# Patient Record
Sex: FEMALE | Race: OTHER | HISPANIC OR LATINO | Employment: STUDENT | URBAN - METROPOLITAN AREA
[De-identification: names, ages, dates, MRNs, and addresses within clinical notes are randomized per-mention and may not be internally consistent; named-entity substitution may affect disease eponyms.]

---
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
## 2025-06-02 ENCOUNTER — HOSPITAL ENCOUNTER (EMERGENCY)
Facility: HOSPITAL | Age: 25
Discharge: HOME | End: 2025-06-02
Attending: EMERGENCY MEDICINE
Payer: MEDICARE

## 2025-06-02 VITALS
RESPIRATION RATE: 17 BRPM | DIASTOLIC BLOOD PRESSURE: 68 MMHG | HEART RATE: 84 BPM | BODY MASS INDEX: 23.29 KG/M2 | TEMPERATURE: 98.3 F | WEIGHT: 119.27 LBS | OXYGEN SATURATION: 100 % | SYSTOLIC BLOOD PRESSURE: 108 MMHG

## 2025-06-02 DIAGNOSIS — O26.899 ABDOMINAL PAIN DURING PREGNANCY, ANTEPARTUM: Primary | ICD-10-CM

## 2025-06-02 DIAGNOSIS — R10.9 ABDOMINAL PAIN DURING PREGNANCY, ANTEPARTUM: Primary | ICD-10-CM

## 2025-06-02 LAB
B-HCG SERPL-ACNC: 35.9 MIU/ML (ref 0–5)
BACTERIA UR QL AUTO: ABNORMAL /HPF
BILIRUB UR QL STRIP: NEGATIVE
CLARITY UR: CLEAR
COLOR UR: ABNORMAL
GLUCOSE UR STRIP-MCNC: NEGATIVE MG/DL
HGB UR QL STRIP.AUTO: 10
KETONES UR STRIP-MCNC: NEGATIVE MG/DL
LEUKOCYTE ESTERASE UR QL STRIP: NEGATIVE
MUCOUS THREADS UR QL AUTO: ABNORMAL
NITRITE UR QL STRIP: NEGATIVE
NON-SQ EPI CELLS URNS QL MICRO: ABNORMAL /HPF
PH UR STRIP.AUTO: 6 [PH]
PROT UR STRIP-MCNC: NEGATIVE MG/DL
RBC #/AREA URNS AUTO: ABNORMAL /HPF
SP GR UR STRIP.AUTO: 1.02 (ref 1–1.04)
UROBILINOGEN UA: NEGATIVE MG/DL
WBC #/AREA URNS AUTO: ABNORMAL /HPF

## 2025-06-02 PROCEDURE — 84702 CHORIONIC GONADOTROPIN TEST: CPT | Performed by: EMERGENCY MEDICINE

## 2025-06-02 PROCEDURE — 36415 COLL VENOUS BLD VENIPUNCTURE: CPT | Performed by: EMERGENCY MEDICINE

## 2025-06-02 PROCEDURE — 99284 EMERGENCY DEPT VISIT MOD MDM: CPT | Performed by: EMERGENCY MEDICINE

## 2025-06-02 PROCEDURE — 81003 URINALYSIS AUTO W/O SCOPE: CPT | Performed by: EMERGENCY MEDICINE

## 2025-06-02 PROCEDURE — 81001 URINALYSIS AUTO W/SCOPE: CPT | Performed by: EMERGENCY MEDICINE

## 2025-06-02 PROCEDURE — 99283 EMERGENCY DEPT VISIT LOW MDM: CPT

## 2025-06-02 RX ORDER — ACETAMINOPHEN 325 MG/1
650 TABLET ORAL ONCE
Status: COMPLETED | OUTPATIENT
Start: 2025-06-02 | End: 2025-06-02

## 2025-06-02 RX ADMIN — ACETAMINOPHEN 650 MG: 325 TABLET ORAL at 02:00

## 2025-06-02 NOTE — ED PROVIDER NOTES
Time reflects when diagnosis was documented in both MDM as applicable and the Disposition within this note       Time User Action Codes Description Comment    6/2/2025  2:51 AM Check, Erick Add [O26.899,  R10.9] Abdominal pain during pregnancy, antepartum           ED Disposition       ED Disposition   Discharge    Condition   Stable    Date/Time   Mon Jun 2, 2025  2:51 AM    Comment   Bella Brenner Henson discharge to home/self care.                   Assessment & Plan       Medical Decision Making  25-year-old female presents to the emergency department for evaluation of right lower quadrant abdominal pain.  Differential diagnosis includes but is not limited to pregnancy, ectopic pregnancy, cystitis.  Plan to check quantitative hCG, urinalysis and reassess.    Quantitative hCG 35.9 likely demonstrating early pregnancy, cannot rule out ectopic pregnancy.  Attempted to perform bedside ultrasound, unable to identify definitive IUP which is similar to prior study.  Urinalysis negative for infection.  Patient stable for discharge with outpatient follow-up.  Order for quantitative hCG to be performed in the next 48 hours.  Patient instructed to call her OB/GYN office in the morning to schedule close outpatient follow-up.  These instructions were given to the patient utilizing .  She expressed understanding was in agreement with the plan.    Problems Addressed:  Abdominal pain during pregnancy, antepartum: acute illness or injury    Amount and/or Complexity of Data Reviewed  External Data Reviewed: notes.  Labs: ordered. Decision-making details documented in ED Course.    Risk  OTC drugs.        ED Course as of 06/02/25 0500   Mon Jun 02, 2025   0233 HCG QUANTITATIVE(!): 35.9  Trending upward from 2 days ago       Medications   acetaminophen (TYLENOL) tablet 650 mg (650 mg Oral Given 6/2/25 0200)       ED Risk Strat Scores                    No data recorded                            History of  Present Illness       Chief Complaint   Patient presents with    Abdominal Pain Pregnant       Past Medical History[1]   Past Surgical History[2]   Family History[3]   Social History[4]   E-Cigarette/Vaping    E-Cigarette Use Current Every Day User       E-Cigarette/Vaping Substances    Nicotine Yes       I have reviewed and agree with the history as documented.     25-year-old female presents to the emergency department for evaluation of ongoing right lower quadrant abdominal pain.  Pain started couple days ago.  Is intermittent.  Nonradiating.  Is moderate in intensity.  No obvious worsening or alleviating factors.  No associated vaginal bleeding or discharge.  No headache, fever, chills, chest pain or shortness of breath.  She does report some nausea, but no vomiting.  She was seen at [facility] for the same pain, at that time she had a positive pregnancy test, they performed a pelvic ultrasound which did not identify a definitive IUP.  She also had an ultrasound which was negative for ovarian torsion and she was discharged home to follow-up closely with OB/GYN.  She states her last menstrual period was April 16.        Review of Systems   Constitutional:  Negative for chills and fever.   HENT:  Negative for ear pain and sore throat.    Eyes:  Negative for pain and visual disturbance.   Respiratory:  Negative for cough and shortness of breath.    Cardiovascular:  Negative for chest pain and palpitations.   Gastrointestinal:  Positive for abdominal pain and nausea. Negative for vomiting.   Genitourinary:  Negative for dysuria and hematuria.   Musculoskeletal:  Negative for arthralgias and back pain.   Skin:  Negative for color change and rash.   Neurological:  Negative for seizures and syncope.   All other systems reviewed and are negative.          Objective       ED Triage Vitals   Temperature Pulse Blood Pressure Respirations SpO2 Patient Position - Orthostatic VS   06/02/25 0141 06/02/25 0125  06/02/25 0125 06/02/25 0125 06/02/25 0125 06/02/25 0125   98.3 °F (36.8 °C) 84 (!) 157/120 17 100 % Sitting      Temp Source Heart Rate Source BP Location FiO2 (%) Pain Score    06/02/25 0141 06/02/25 0125 06/02/25 0125 -- 06/02/25 0125    Oral Monitor Right arm  7      Vitals      Date and Time Temp Pulse SpO2 Resp BP Pain Score FACES Pain Rating User   06/02/25 0257 -- -- -- -- 108/68 -- -- RC   06/02/25 0200 -- -- -- -- -- 7 -- CFW   06/02/25 0141 98.3 °F (36.8 °C) -- -- -- -- -- -- CFW   06/02/25 0125 -- 84 100 % 17 157/120 7 -- CFW            Physical Exam  Vitals and nursing note reviewed.   Constitutional:       General: She is not in acute distress.  HENT:      Head: Normocephalic and atraumatic.      Right Ear: External ear normal.      Left Ear: External ear normal.      Nose: Nose normal.      Mouth/Throat:      Mouth: Mucous membranes are moist.     Eyes:      Extraocular Movements: Extraocular movements intact.      Conjunctiva/sclera: Conjunctivae normal.      Pupils: Pupils are equal, round, and reactive to light.       Cardiovascular:      Rate and Rhythm: Normal rate and regular rhythm.      Pulses: Normal pulses.      Heart sounds: Normal heart sounds. No murmur heard.  Pulmonary:      Effort: Pulmonary effort is normal. No respiratory distress.      Breath sounds: No stridor.   Abdominal:      General: Abdomen is flat.      Tenderness: There is no abdominal tenderness. There is no guarding or rebound.     Musculoskeletal:         General: No deformity. Normal range of motion.      Cervical back: Normal range of motion and neck supple.     Skin:     General: Skin is warm and dry.      Capillary Refill: Capillary refill takes less than 2 seconds.     Neurological:      General: No focal deficit present.      Mental Status: She is alert and oriented to person, place, and time.     Psychiatric:         Mood and Affect: Mood normal.         Behavior: Behavior normal.         Results Reviewed        Procedure Component Value Units Date/Time    hCG, quantitative [[id number]]  (Abnormal) Collected: 06/02/25 0157    Lab Status: Final result Specimen: Blood from Arm, Left Updated: 06/02/25 0231     HCG, Quant 35.9 mIU/mL     Narrative:       Expected Ranges:    HCG results between 5.0 and 25.0 mIU/mL may be indicative of early pregnancy but should be interpreted in light of the total clinical presentation.    HCG can rise to detectable levels in derik and post menopausal women (0-11.6 mIU/mL).     Approximate               Approximate HCG  Gestation age          Concentration ( mIU/mL)  _____________          ______________________   Weeks                      HCG values  0.2-1                       5-50  1-2                           2-3                         100-5000  3-4                         500-60427  4-5                         1000-70858  5-6                         [id number]  6-8                         [id number]  8-12                        [id number]      Urine Microscopic [[id number]]  (Abnormal) Collected: 06/02/25 0159    Lab Status: Final result Specimen: Urine, Clean Catch Updated: 06/02/25 0215     RBC, UA 1-2 /hpf      WBC, UA 1-2 /hpf      Epithelial Cells Occasional /hpf      Bacteria, UA Occasional /hpf      MUCUS THREADS Occasional    UA w Reflex to Microscopic w Reflex to Culture [[id number]]  (Abnormal) Collected: 06/02/25 0159    Lab Status: Final result Specimen: Urine, Clean Catch Updated: 06/02/25 0214     Color, UA Straw     Clarity, UA Clear     Specific Gravity, UA 1.020     pH, UA 6.0     Leukocytes, UA Negative     Nitrite, UA Negative     Protein, UA Negative mg/dl      Glucose, UA Negative mg/dl      Ketones, UA Negative mg/dl      Bilirubin, UA Negative     Occult Blood, UA 10.0     UROBILINOGEN UA Negative mg/dL             No orders to display       Procedures    ED Medication and Procedure Management   Prior to Admission Medications   Prescriptions Last Dose  Informant Patient Reported? Taking?   Prenatal Multivit-Min-Fe-FA (PRE-JAIRO PO)   Yes No   Sig: Take by mouth   acetaminophen (TYLENOL) 325 mg tablet   No No   Sig: Take 2 tablets (650 mg total) by mouth every 6 (six) hours as needed for headaches   ferrous sulfate 325 (65 Fe) mg tablet  Self Yes No   Sig: Take 325 mg by mouth daily with breakfast   ibuprofen (MOTRIN) 200 mg tablet   No No   Sig: Take 3 tablets (600 mg total) by mouth every 6 (six) hours as needed for mild pain      Facility-Administered Medications: None     Discharge Medication List as of 2025  2:57 AM        CONTINUE these medications which have NOT CHANGED    Details   acetaminophen (TYLENOL) 325 mg tablet Take 2 tablets (650 mg total) by mouth every 6 (six) hours as needed for headaches, Starting 2020, Normal      ferrous sulfate 325 (65 Fe) mg tablet Take 325 mg by mouth daily with breakfast, Historical Med      ibuprofen (MOTRIN) 200 mg tablet Take 3 tablets (600 mg total) by mouth every 6 (six) hours as needed for mild pain, Starting 2020, No Print      Prenatal Multivit-Min-Fe-FA (PRE-JAIRO PO) Take by mouth, Historical Med           Outpatient Discharge Orders   hCG, quantitative   Standing Status: Future Standing Exp. Date: 26     ED SEPSIS DOCUMENTATION   Time reflects when diagnosis was documented in both MDM as applicable and the Disposition within this note       Time User Action Codes Description Comment    2025  2:51 AM Erick Bryan Add [O26.899,  R10.9] Abdominal pain during pregnancy, antepartum                    [1] No past medical history on file.  [2] No past surgical history on file.  [3]   Family History  Problem Relation Name Age of Onset    No Known Problems Mother      No Known Problems Father     [4]   Social History  Tobacco Use    Smoking status: Never    Smokeless tobacco: Never   Vaping Use    Vaping status: Every Day    Substances: Nicotine   Substance Use Topics    Alcohol use: No     Drug use: No        Erick Bryan MD  06/02/25 8226

## 2025-06-02 NOTE — DISCHARGE INSTRUCTIONS
Recommend Tylenol 500 mg every 6 hours as needed for pain  Take Zofran as needed for nausea and vomiting  Recommend you start taking prenatal vitamins  Have repeat blood work performed in 48 hours  Follow-up closely with OB/GYN, return for any worsening symptoms    Patient Education     Dolor de estómago en las primeras etapas del embarazo   Acerca de solitario krysten   Muchas mujeres sienten dolor de estómago o calambres en el primer trimestre del embarazo. El primer trimestre dura hasta el final de la semana 12. Cuando está embarazada, colon cuerpo cambia por dentro y por fuera a medida que colon bebé crece. Estos cambios pueden causar dolor o calambres que son normales valeria el embarazo.  ¿Cuáles son las causas?   El dolor de estómago es muy común valeria el embarazo y puede ocurrir por muchas razones. Puede tener dolor o calambres:  Es posible que tenga calambres leves cuando el bebé se implante en el revestimiento del útero.  A medida que el bebé crece, colon útero se estira y crece. Colon útero se mantiene en colon lugar mediante ligamentos que también se estiran valeria el embarazo. A medida que colon útero se agranda y lisa de estos ligamentos, puede causar dolor. Randall se llama dolor de ligamento keegan.  A medida que te mueves. Llevar a un bebé ejerce presión sobre carola articulaciones, músculos y ligamentos.  Cuando tienes un orgasmo.  Si tiene gases o está estreñido. Colon tracto digestivo se ralentiza mientras está embarazada.  Si se enferma con la gripe estomacal o un virus.  Si tiene latosha infección de transmisión sexual, helio gonorrea, clamidia, vaginosis bacteriana o infecciones por hongos.  Si tiene latosha infección en el tracto urinario.  Si el embarazo está creciendo fuera del útero, helio en latosha trompa de Falopio u ovario.  ¿Qué medicamentos pueden ser necesarios?   Es posible que el médico le recete medicamentos para lo siguiente:  Aliviar el dolor, helio el acetaminofén (Tylenol)  Aliviar la constipación  Aliviar los  gases  Tratar latosha infección  Consulte siempre al médico antes de charlotte cualquier medicamento mientras está embarazada.  ¿Estará restringida la actividad física?   Hable con colon médico sobre los ejercicios que pueden ser apropiados para usted mientras está embarazada. Son útiles los ejercicios de estiramiento, el yoga y los ejercicios para el tronco, fede consulte al médico antes de empezar con un nuevo programa de ejercicios.  ¿Será necesario algún otro cuidado?   Pregunte a colon médico qué debe hacer al llegar a casa. Asegúrese de hacer preguntas si no entiende lo que debe hacer.  Si el médico le indica que utilice calor, tome un baño tibio o póngase latosha almohadilla térmica en el estómago valeria no más de 20 minutos por vez. No duerma con la almohadilla térmica, ya que podría sufrir quemaduras.  Cambie de posición lentamente. Inclínese y flexiónese por las caderas para evitar tirar de los ligamentos redondos. Puede aliviar el dolor si se acuesta y reposa con latosha almohada entre las piernas.  Aumente la cantidad de agua que roopa. Debe beber de al menos 8 vasos de agua cada día. Es posible que tenga menos calambres si roopa suficiente agua.  Aumente la cantidad de fibra en colon dieta. La fibra adicional puede ayudarla a ir al baño y prevenir el estreñimiento. Hable con colon médico sobre cómo comer latosha dieta saludable valeria el embarazo.  ¿Qué problemas podrían surgir?   La fatiga también puede ser signo de un problema más grave. A veces, el dolor de estómago valeria el embarazo temprano puede ser un signo de latosha infección, un aborto espontáneo o un embarazo ectópico.  ¿Cuándo vamshi llamar al médico?   Llame al servicio de emergencias si:  Tiene latosha hemorragia tan grave que se siente muy débil o helio si se fuera a desmayar.  Tiene signos de infección, helio fiebre de 38 °C (100,4 °F) o más, escalofríos, dolor abdominal o vaginal intenso, o flujo vaginal maloliente.  Llame al médico o la partera si presenta lo  siguiente:  Fiebre de 38 °C (100,4 °F) o más zaina sin otros síntomas  Dolor muy mikal que no desaparece con el cambio de posición o con beber agua  Dolor de espalda bajo y sordo que no desaparece  Dolor en el hombro o charlotte  Presión en la pelvis que se siente helio si el bebé estuviera empujando  Secreción en chorro o goteo tripp de un líquido acuoso o con enrrique de la vagina  Calambres en la parte baja del abdomen que son constantes o solamente en un costado  Dolor al orinar o enrrique en la orina  Vómitos con dolor  Dificultad para caminar, hablar o respirar debido al dolor  Sangrado vaginal con o sin dolor  Latosha lesión en el abdomen, helio un accidente automovilístico o latosha caída  Ideas sobre lastimarse o lastimar a otros, o si no se siente a jolanta en el hogar  Latosha secreción de olor desagradable de la vagina que es de color amarillo o verdoso  Exención de responsabilidad y uso de la información del consumidor   Esta información general es un resumen limitado de la información sobre el diagnóstico, el tratamiento y/o la medicación. No pretende ser exhaustivo y debe utilizarse helio latosha herramienta para ayudar al usuario a comprender y/o evaluar las posibles opciones de diagnóstico y tratamiento. NO incluye toda la información sobre las enfermedades, los tratamientos, los medicamentos, los efectos secundarios o los riesgos que pueden aplicarse a un paciente específico. No tiene por objeto ser un consejo médico ni un sustituto del consejo médico. Tampoco pretende reemplazar al diagnóstico o el tratamiento proporcionados por un proveedor de atención médica con base en el examen y la evaluación por parte de solitario proveedor de las circunstancias específicas y únicas de un paciente. Los pacientes deben hablar con un proveedor de atención médica para obtener información completa sobre colon lesly, preguntas médicas y opciones de tratamiento, incluidos los riesgos o beneficios relacionados con el uso de medicamentos. Esta  información no respalda ningún tratamiento o medicamento helio seguro, eficaz o aprobado para tratar a un paciente específico. Sarah Crockett y carola afiliados renuncian a cualquier garantía o responsabilidad relacionada con esta información o con el uso que se madonna de esta. El uso de esta información se rige por las Condiciones de uso, disponibles en [email or web address] [email or web address]/know/clinical-effectiveness-terms   Copyright   Copyright © 2024 [employer] y carola licenciantes y/o afiliados. Todos los derechos reservados.

## 2025-06-05 ENCOUNTER — HOSPITAL ENCOUNTER (EMERGENCY)
Facility: HOSPITAL | Age: 25
Discharge: HOME | End: 2025-06-05
Attending: EMERGENCY MEDICINE
Payer: MEDICARE

## 2025-06-05 VITALS
TEMPERATURE: 98.7 F | OXYGEN SATURATION: 98 % | BODY MASS INDEX: 23.25 KG/M2 | WEIGHT: 119.05 LBS | RESPIRATION RATE: 16 BRPM | SYSTOLIC BLOOD PRESSURE: 114 MMHG | HEART RATE: 98 BPM | DIASTOLIC BLOOD PRESSURE: 64 MMHG

## 2025-06-05 DIAGNOSIS — O03.9 MISCARRIAGE: Primary | ICD-10-CM

## 2025-06-05 LAB
B-HCG SERPL-ACNC: 10.8 MIU/ML (ref 0–5)
BASOPHILS # BLD AUTO: 0.08 THOUSANDS/ÂΜL (ref 0–0.1)
BASOPHILS NFR BLD AUTO: 1 % (ref 0–1)
EOSINOPHIL # BLD AUTO: 1.07 THOUSAND/ÂΜL (ref 0–0.61)
EOSINOPHIL NFR BLD AUTO: 15 % (ref 0–6)
ERYTHROCYTE [DISTWIDTH] IN BLOOD BY AUTOMATED COUNT: 13.2 % (ref 11.6–15.1)
HCT VFR BLD AUTO: 41.1 % (ref 34.8–46.1)
HGB BLD-MCNC: 13.2 G/DL (ref 11.5–15.4)
IMM GRANULOCYTES # BLD AUTO: 0.02 THOUSAND/UL (ref 0–0.2)
IMM GRANULOCYTES NFR BLD AUTO: 0 % (ref 0–2)
LYMPHOCYTES # BLD AUTO: 2.38 THOUSANDS/ÂΜL (ref 0.6–4.47)
LYMPHOCYTES NFR BLD AUTO: 33 % (ref 14–44)
MCH RBC QN AUTO: 30.1 PG (ref 26.8–34.3)
MCHC RBC AUTO-ENTMCNC: 32.1 G/DL (ref 31.4–37.4)
MCV RBC AUTO: 94 FL (ref 82–98)
MONOCYTES # BLD AUTO: 0.69 THOUSAND/ÂΜL (ref 0.17–1.22)
MONOCYTES NFR BLD AUTO: 10 % (ref 4–12)
NEUTROPHILS # BLD AUTO: 2.95 THOUSANDS/ÂΜL (ref 1.85–7.62)
NEUTS SEG NFR BLD AUTO: 41 % (ref 43–75)
NRBC BLD AUTO-RTO: 0 /100 WBCS
PLATELET # BLD AUTO: 327 THOUSANDS/UL (ref 149–390)
PMV BLD AUTO: 8.9 FL (ref 8.9–12.7)
RBC # BLD AUTO: 4.38 MILLION/UL (ref 3.81–5.12)
WBC # BLD AUTO: 7.19 THOUSAND/UL (ref 4.31–10.16)

## 2025-06-05 PROCEDURE — 36415 COLL VENOUS BLD VENIPUNCTURE: CPT | Performed by: EMERGENCY MEDICINE

## 2025-06-05 PROCEDURE — 84702 CHORIONIC GONADOTROPIN TEST: CPT | Performed by: EMERGENCY MEDICINE

## 2025-06-05 PROCEDURE — 85025 COMPLETE CBC W/AUTO DIFF WBC: CPT | Performed by: EMERGENCY MEDICINE

## 2025-06-05 PROCEDURE — 99284 EMERGENCY DEPT VISIT MOD MDM: CPT | Performed by: EMERGENCY MEDICINE

## 2025-06-05 PROCEDURE — 99284 EMERGENCY DEPT VISIT MOD MDM: CPT

## 2025-06-05 NOTE — ED PROVIDER NOTES
Time reflects when diagnosis was documented in both MDM as applicable and the Disposition within this note       Time User Action Codes Description Comment    6/5/2025  1:22 PM Oliver Newell Add [O03.9] Miscarriage           ED Disposition       ED Disposition   Discharge    Condition   Stable    Date/Time   Thu Jun 5, 2025  1:22 PM    Comment   Bella Brenner Henson discharge to home/self care.                   Assessment & Plan       Medical Decision Making  25-year-old female presented emerged part with bleeding in pregnancy.  LMP 4/12/2025.  Differential diagnosis includes ectopic pregnancy, threatened AB, UTI.  Patient had a hCG of 17 on 30 May, 35 on 3 Toshia.  It has been 48 hours since last hCG, will see what it is today.  It is possible patient is having miscarriage because the hCG numbers are too low for a 6-week pregnancy.  hCG today of 10.  Likely ongoing miscarriage.  OB/GYN follow-up.  Expectant management.    Amount and/or Complexity of Data Reviewed  Labs: ordered.             Medications - No data to display    ED Risk Strat Scores                    No data recorded        SBIRT 22yo+      Flowsheet Row Most Recent Value   Initial Alcohol Screen: US AUDIT-C     1. How often do you have a drink containing alcohol? 0 Filed at: 06/05/2025 1227   2. How many drinks containing alcohol do you have on a typical day you are drinking?  0 Filed at: 06/05/2025 1227   3b. FEMALE Any Age, or MALE 65+: How often do you have 4 or more drinks on one occassion? 0 Filed at: 06/05/2025 1227   Audit-C Score 0 Filed at: 06/05/2025 1227   ABIGAIL: How many times in the past year have you...    Used an illegal drug or used a prescription medication for non-medical reasons? Never Filed at: 06/05/2025 1227                            History of Present Illness       Chief Complaint   Patient presents with    Vaginal Bleeding - Pregnant     Pt states she was seen here recently and was told she was pregnant, pt LMP is 4/16.  Pt  c/o vaginal bleeding and lower back pain that started today. No meds pta.        Past Medical History[1]   Past Surgical History[2]   Family History[3]   Social History[4]   E-Cigarette/Vaping    E-Cigarette Use Current Every Day User       E-Cigarette/Vaping Substances    Nicotine Yes       I have reviewed and agree with the history as documented.     25-year-old female who was early pregnancy presenting to the emerged department with vaginal bleeding starting today.  Small amount of bleeding.  No pelvic pain.        Review of Systems   Constitutional:  Negative for chills and fever.   HENT:  Negative for ear pain and sore throat.    Eyes:  Negative for pain and visual disturbance.   Respiratory:  Negative for cough and shortness of breath.    Cardiovascular:  Negative for chest pain and palpitations.   Gastrointestinal:  Negative for abdominal pain and vomiting.   Genitourinary:  Positive for vaginal bleeding. Negative for dysuria and hematuria.   Musculoskeletal:  Negative for arthralgias and back pain.   Skin:  Negative for color change and rash.   Neurological:  Negative for seizures and syncope.   All other systems reviewed and are negative.          Objective       ED Triage Vitals [06/05/25 1227]   Temperature Pulse Blood Pressure Respirations SpO2 Patient Position - Orthostatic VS   98.7 °F (37.1 °C) 98 114/64 16 98 % Sitting      Temp Source Heart Rate Source BP Location FiO2 (%) Pain Score    Oral Monitor Right arm -- --      Vitals      Date and Time Temp Pulse SpO2 Resp BP Pain Score FACES Pain Rating User   06/05/25 1227 98.7 °F (37.1 °C) 98 98 % 16 114/64 -- -- [place]            Physical Exam  Vitals and nursing note reviewed.   Constitutional:       General: She is not in acute distress.     Appearance: She is well-developed.   HENT:      Head: Normocephalic and atraumatic.     Eyes:      Conjunctiva/sclera: Conjunctivae normal.       Cardiovascular:      Rate and Rhythm: Normal rate and regular rhythm.       Heart sounds: No murmur heard.  Pulmonary:      Effort: Pulmonary effort is normal. No respiratory distress.      Breath sounds: Normal breath sounds.   Abdominal:      Palpations: Abdomen is soft.      Tenderness: There is no abdominal tenderness.     Musculoskeletal:         General: No swelling.      Cervical back: Neck supple.     Skin:     General: Skin is warm and dry.      Capillary Refill: Capillary refill takes less than 2 seconds.     Neurological:      Mental Status: She is alert.     Psychiatric:         Mood and Affect: Mood normal.         Results Reviewed       Procedure Component Value Units Date/Time    Quantitative hCG [[id number]]  (Abnormal) Collected: 06/05/25 1249    Lab Status: Final result Specimen: Blood from Arm, Right Updated: 06/05/25 1320     HCG, Quant 10.8 mIU/mL     Narrative:       Expected Ranges:    HCG results between 5.0 and 25.0 mIU/mL may be indicative of early pregnancy but should be interpreted in light of the total clinical presentation.    HCG can rise to detectable levels in derik and post menopausal women (0-11.6 mIU/mL).     Approximate               Approximate HCG  Gestation age          Concentration ( mIU/mL)  _____________          ______________________   Weeks                      HCG values  0.2-1                       5-50  1-2                           2-3                         100-5000  3-4                         500-85046  4-5                         1000-44943  5-6                         [id number]  6-8                         [id number]  8-12                        [id number]      CBC and differential [[id number]]  (Abnormal) Collected: 06/05/25 1249    Lab Status: Final result Specimen: Blood from Arm, Right Updated: 06/05/25 1256     WBC 7.19 Thousand/uL      RBC 4.38 Million/uL      Hemoglobin 13.2 g/dL      Hematocrit 41.1 %      MCV 94 fL      MCH 30.1 pg      MCHC 32.1 g/dL      RDW 13.2 %      MPV 8.9 fL      Platelets 327  Thousands/uL      nRBC 0 /100 WBCs      Segmented % 41 %      Immature Grans % 0 %      Lymphocytes % 33 %      Monocytes % 10 %      Eosinophils Relative 15 %      Basophils Relative 1 %      Absolute Neutrophils 2.95 Thousands/µL      Absolute Immature Grans 0.02 Thousand/uL      Absolute Lymphocytes 2.38 Thousands/µL      Absolute Monocytes 0.69 Thousand/µL      Eosinophils Absolute 1.07 Thousand/µL      Basophils Absolute 0.08 Thousands/µL     UA (URINE) with reflex to Scope [[id number]]     Lab Status: No result Specimen: Urine             No orders to display       Procedures    ED Medication and Procedure Management   Prior to Admission Medications   Prescriptions Last Dose Informant Patient Reported? Taking?   Prenatal Multivit-Min-Fe-FA (PRE- PO)   Yes No   Sig: Take by mouth   acetaminophen (TYLENOL) 325 mg tablet   No No   Sig: Take 2 tablets (650 mg total) by mouth every 6 (six) hours as needed for headaches   ferrous sulfate 325 (65 Fe) mg tablet  Self Yes No   Sig: Take 325 mg by mouth daily with breakfast   ibuprofen (MOTRIN) 200 mg tablet   No No   Sig: Take 3 tablets (600 mg total) by mouth every 6 (six) hours as needed for mild pain      Facility-Administered Medications: None     Patient's Medications   Discharge Prescriptions    No medications on file       ED SEPSIS DOCUMENTATION   Time reflects when diagnosis was documented in both MDM as applicable and the Disposition within this note       Time User Action Codes Description Comment    2025  1:22 PM Oliver Newell Add [O03.9] Miscarriage                      [1] No past medical history on file.  [2] No past surgical history on file.  [3]   Family History  Problem Relation Name Age of Onset    No Known Problems Mother      No Known Problems Father     [4]   Social History  Tobacco Use    Smoking status: Never    Smokeless tobacco: Never   Vaping Use    Vaping status: Every Day    Substances: Nicotine   Substance Use Topics    Alcohol use:  No    Drug use: No        Virat Ira Newell MD  06/05/25 [id number]

## 2025-06-05 NOTE — Clinical Note
Bella Henson was seen and treated in our emergency department on 6/5/2025.                Diagnosis:     Bella  may return to work on return date.    She may return on this date: 06/06/2025         If you have any questions or concerns, please don't hesitate to call.      Oliver Newell MD    ______________________________           _______________          _______________  Hospital Representative                              Date                                Time

## 2025-06-05 NOTE — Clinical Note
Bella Henson was seen and treated in our emergency department on 6/5/2025.                Diagnosis:     Bella  may return to work on return date.    She may return on this date: 06/06/2025         If you have any questions or concerns, please don't hesitate to call.      Oliver Newell MD    ______________________________           _______________          _______________  Hospital Representative                              Date                                Time Wound Evaluated By: Mary Beth Calvillo MD Detail Level: Detailed Add 12679 Cpt? (Important Note: In 2017 The Use Of 04919 Is Being Tracked By Cms To Determine Future Global Period Reimbursement For Global Periods): yes

## 2025-06-06 ENCOUNTER — NURSE TRIAGE (OUTPATIENT)
Age: 25
End: 2025-06-06

## 2025-06-06 NOTE — TELEPHONE ENCOUNTER
"REASON FOR CONVERSATION: Appointment    SYMPTOMS: moderate vaginal bleeding and pelvic cramping.     OTHER HEALTH INFORMATION: In ER yesterday, diagnosed with miscarriage. She is not experiencing any new or worsening symptoms but needs to schedule a new patient follow up visit.     PROTOCOL DISPOSITION: See Within 1 Week in Office (overriding Home Care)    CARE ADVICE PROVIDED: ER with saturating a pad or more an hour, passing blood clots the size of a golf or larger, or experiencing severe pelvic pain, chest pain, shortness of breath, dizziness or lightheadedness      PRACTICE FOLLOW-UP: none.               Reason for Disposition   Health information question, no triage required and triager able to answer question    Answer Assessment - Initial Assessment Questions  1. REASON FOR CALL: \"What is the main reason for your call?\" or \"How can I best help you?\"      Schedule ED follow up  2. SYMPTOMS : \"Do you have any symptoms?\"     Moderate  Vaginal bleeding and cramping    Protocols used: Information Only Call - No Triage-Adult-[place]    "